# Patient Record
Sex: MALE | Race: WHITE | Employment: FULL TIME | URBAN - METROPOLITAN AREA
[De-identification: names, ages, dates, MRNs, and addresses within clinical notes are randomized per-mention and may not be internally consistent; named-entity substitution may affect disease eponyms.]

---

## 2017-03-29 ENCOUNTER — HOSPITAL ENCOUNTER (OUTPATIENT)
Age: 37
Discharge: HOME | End: 2017-03-29
Attending: FAMILY MEDICINE
Payer: MEDICAID

## 2017-03-29 VITALS
SYSTOLIC BLOOD PRESSURE: 137 MMHG | HEART RATE: 97 BPM | OXYGEN SATURATION: 96 % | RESPIRATION RATE: 16 BRPM | DIASTOLIC BLOOD PRESSURE: 89 MMHG | HEIGHT: 72 IN | BODY MASS INDEX: 20.05 KG/M2 | WEIGHT: 148 LBS | TEMPERATURE: 99 F

## 2017-03-29 DIAGNOSIS — J03.90 EXUDATIVE TONSILLITIS: Primary | ICD-10-CM

## 2017-03-29 LAB — POCT RAPID STREP: NEGATIVE

## 2017-03-29 PROCEDURE — 87430 STREP A AG IA: CPT | Performed by: FAMILY MEDICINE

## 2017-03-29 PROCEDURE — 99204 OFFICE O/P NEW MOD 45 MIN: CPT

## 2017-03-29 PROCEDURE — 87081 CULTURE SCREEN ONLY: CPT | Performed by: FAMILY MEDICINE

## 2017-03-29 PROCEDURE — 99214 OFFICE O/P EST MOD 30 MIN: CPT

## 2017-03-29 RX ORDER — AZITHROMYCIN 250 MG/1
TABLET, FILM COATED ORAL
Qty: 1 PACKAGE | Refills: 0 | Status: SHIPPED | OUTPATIENT
Start: 2017-03-29 | End: 2017-04-03

## 2017-03-29 NOTE — ED PROVIDER NOTES
Patient Seen in: [facility]    History   Patient presents with:  Sore Throat    Stated Complaint: throat pain x3 days     HPI    Patient is a 38-year-old male.   Coming in today with complaint of sore throat for the past 2 d Negative. Genitourinary: Negative. Musculoskeletal: Negative. Skin: Negative. Neurological: Negative. Positive for stated complaint: throat pain x3 days   Other systems are as noted in HPI. Constitutional and vital signs reviewed. Course     Labs Reviewed   POCT RAPID STREP - Normal   GRP A STREP CULT, THROAT       MDM           Disposition and Plan     Clinical Impression:  Exudative tonsillitis  (primary encounter diagnosis)    Disposition:  Discharge    Follow-up:  Primary MD

## 2017-04-01 ENCOUNTER — HOSPITAL ENCOUNTER (OUTPATIENT)
Age: 37
Discharge: HOME | End: 2017-04-01
Attending: FAMILY MEDICINE
Payer: MEDICAID

## 2017-04-01 VITALS
DIASTOLIC BLOOD PRESSURE: 88 MMHG | TEMPERATURE: 100 F | RESPIRATION RATE: 18 BRPM | SYSTOLIC BLOOD PRESSURE: 133 MMHG | OXYGEN SATURATION: 96 % | HEART RATE: 104 BPM

## 2017-04-01 DIAGNOSIS — B27.90 INFECTIOUS MONONUCLEOSIS WITHOUT COMPLICATION, INFECTIOUS MONONUCLEOSIS DUE TO UNSPECIFIED ORGANISM: Primary | ICD-10-CM

## 2017-04-01 PROCEDURE — 86308 HETEROPHILE ANTIBODY SCREEN: CPT | Performed by: FAMILY MEDICINE

## 2017-04-01 PROCEDURE — 99213 OFFICE O/P EST LOW 20 MIN: CPT

## 2017-04-01 PROCEDURE — 36415 COLL VENOUS BLD VENIPUNCTURE: CPT

## 2017-04-01 NOTE — ED INITIAL ASSESSMENT (HPI)
Pt here for re-evaluation, pt seen here on Wednesday, pt was dx with strep. Pt was prescribed zpak pt states he's not better. Pt continues to have a sore throat and pain with swallowing.

## 2017-04-01 NOTE — ED NOTES
Contacted the patient regarding a [facility] fax that was received stating his throat is feeling worse. The patient states the pain in his throat has gotten worse and the white spots in the throat are also worse.   He states he is currently at work but plans

## 2017-04-01 NOTE — ED PROVIDER NOTES
Patient Seen in: [facility]    History   Patient presents with: Follow - Up    Stated Complaint: follow up    Patient is a 42year old male presenting with sore throat. Sore Throat  This is a new problem.  The current epi for stated complaint: follow up  Other systems are as noted in HPI. Constitutional and vital signs reviewed. All other systems reviewed and negative except as noted above.     PSFH elements reviewed from today and agreed except as otherwise stated in diagnosis)    Disposition:  Discharge    Follow-up:  Your primary in 1 week.             Medications Prescribed:  Discharge Medication List as of 4/1/2017  3:07 PM

## 2019-05-23 ENCOUNTER — HOSPITAL ENCOUNTER (OUTPATIENT)
Age: 39
Discharge: HOME | End: 2019-05-23
Attending: EMERGENCY MEDICINE
Payer: COMMERCIAL

## 2019-05-23 VITALS
SYSTOLIC BLOOD PRESSURE: 140 MMHG | HEART RATE: 82 BPM | OXYGEN SATURATION: 96 % | RESPIRATION RATE: 18 BRPM | DIASTOLIC BLOOD PRESSURE: 98 MMHG | TEMPERATURE: 98 F

## 2019-05-23 DIAGNOSIS — B02.9 HERPES ZOSTER WITHOUT COMPLICATION: Primary | ICD-10-CM

## 2019-05-23 PROCEDURE — 99213 OFFICE O/P EST LOW 20 MIN: CPT

## 2019-05-23 PROCEDURE — 99214 OFFICE O/P EST MOD 30 MIN: CPT

## 2019-05-23 RX ORDER — ACYCLOVIR 800 MG/1
800 TABLET ORAL
Qty: 35 TABLET | Refills: 0 | Status: SHIPPED | OUTPATIENT
Start: 2019-05-23 | End: 2019-05-30

## 2019-05-23 RX ORDER — PREDNISONE 20 MG/1
40 TABLET ORAL DAILY
Qty: 10 TABLET | Refills: 0 | Status: SHIPPED | OUTPATIENT
Start: 2019-05-23 | End: 2019-05-28

## 2019-05-23 RX ORDER — PREDNISONE 1 MG/1
5 TABLET ORAL DAILY
Qty: 14 TABLET | Refills: 0 | Status: SHIPPED | OUTPATIENT
Start: 2019-05-23 | End: 2019-05-28

## 2019-05-23 NOTE — ED PROVIDER NOTES
Patient Seen in: THE [facility]    History   Patient presents with:  Rash Skin Problem (integumentary)    Stated Complaint: rash x1 day    HPI    Patient noted a rash for the last day. He noticed it last night.   He was having Posterior pharynx is normal.  Uvula midline nonswollen. Tongue is nonswollen. Oromucosa is moist.  Lips are moist.  No oral lesions.   Skin: There is an erythematous patch on the left lower thoracic back with a handful of more brightly erythematous papule

## 2019-05-23 NOTE — PROGRESS NOTES
I am listed as patient's PCP. He has never met me. Please ask him to establish care if he wants to be under our care.

## 2019-05-28 ENCOUNTER — TELEPHONE (OUTPATIENT)
Dept: INTERNAL MEDICINE CLINIC | Facility: CLINIC | Age: 39
End: 2019-05-28

## 2019-05-28 NOTE — TELEPHONE ENCOUNTER
Quinn Dalal MD (Physician)            I am listed as patient's PCP. He has never met me. Please ask him to establish care if he wants to be under our care.         [facility]

## 2019-07-24 ENCOUNTER — OFFICE VISIT (OUTPATIENT)
Dept: INTERNAL MEDICINE CLINIC | Facility: CLINIC | Age: 39
End: 2019-07-24

## 2019-07-24 VITALS
SYSTOLIC BLOOD PRESSURE: 100 MMHG | DIASTOLIC BLOOD PRESSURE: 62 MMHG | TEMPERATURE: 98 F | WEIGHT: 153 LBS | OXYGEN SATURATION: 99 % | BODY MASS INDEX: 20.28 KG/M2 | HEART RATE: 94 BPM | HEIGHT: 73 IN

## 2019-07-24 DIAGNOSIS — L72.3 SEBACEOUS CYST: Primary | ICD-10-CM

## 2019-07-24 DIAGNOSIS — Z00.00 LABORATORY EXAM ORDERED AS PART OF ROUTINE GENERAL MEDICAL EXAMINATION: ICD-10-CM

## 2019-07-24 DIAGNOSIS — K21.9 GASTROESOPHAGEAL REFLUX DISEASE, ESOPHAGITIS PRESENCE NOT SPECIFIED: ICD-10-CM

## 2019-07-24 PROCEDURE — 99204 OFFICE O/P NEW MOD 45 MIN: CPT | Performed by: INTERNAL MEDICINE

## 2019-07-24 RX ORDER — RANITIDINE 150 MG/1
150 TABLET ORAL
COMMUNITY
Start: 2016-01-01 | End: 2021-08-23

## 2019-07-24 RX ORDER — RIBOFLAVIN (VITAMIN B2) 100 MG
100 TABLET ORAL DAILY
COMMUNITY

## 2019-07-24 NOTE — PROGRESS NOTES
Darlene Sesay is a 37year old male. HPI:   Patient presents for two things: he was 6 mintues late  1. Growth on left back ricardo eof his neck: started as a pimple one year ago but could not pop it. Continues to grow. It is not painful.    2. Shingles infe Used    Alcohol use: No    Drug use: No            EXAM:   /62   Pulse 94   Temp 98 °F (36.7 °C) (Oral)   Ht 73\"   Wt 153 lb   SpO2 99%   BMI 20.19 kg/m²   GENERAL: A&O well developed, well nourished, tall and thin, in no apparent distress  SKIN: he

## 2019-08-27 ENCOUNTER — OFFICE VISIT (OUTPATIENT)
Dept: SURGERY | Facility: CLINIC | Age: 39
End: 2019-08-27

## 2019-08-27 VITALS
TEMPERATURE: 98 F | DIASTOLIC BLOOD PRESSURE: 90 MMHG | WEIGHT: 150 LBS | HEART RATE: 88 BPM | BODY MASS INDEX: 20 KG/M2 | SYSTOLIC BLOOD PRESSURE: 153 MMHG

## 2019-08-27 DIAGNOSIS — L72.3 INFLAMED SEBACEOUS CYST: Primary | ICD-10-CM

## 2019-08-27 PROCEDURE — 99243 OFF/OP CNSLTJ NEW/EST LOW 30: CPT | Performed by: SURGERY

## 2019-08-27 NOTE — H&P
New Patient Visit Note       Active Problems      1.  Inflamed sebaceous cyst        Chief Complaint   Patient presents with:  Cyst: NP cyst on lower neck       History of Present Illness     Patient presents at the request of his primary care physician for 7.00        Pack years: 7        Quit date: 2002        Years since quittin.6      Smokeless tobacco: Never Used    Substance and Sexual Activity      Alcohol use: Yes        Comment: 1 drink per month      Drug use: Yes        Types: Cannabis HENT:   Head: Normocephalic and atraumatic. Eyes: Pupils are equal, round, and reactive to light. No scleral icterus. Neck: Normal range of motion. Neck supple. No JVD present. No tracheal deviation present.    Cardiovascular: Normal rate and regular

## 2019-09-05 ENCOUNTER — OFFICE VISIT (OUTPATIENT)
Dept: SURGERY | Facility: CLINIC | Age: 39
End: 2019-09-05

## 2019-09-05 VITALS
SYSTOLIC BLOOD PRESSURE: 131 MMHG | HEART RATE: 111 BPM | BODY MASS INDEX: 19.88 KG/M2 | HEIGHT: 73 IN | WEIGHT: 150 LBS | DIASTOLIC BLOOD PRESSURE: 81 MMHG | TEMPERATURE: 98 F

## 2019-09-05 DIAGNOSIS — L72.3 INFLAMED SEBACEOUS CYST: Primary | ICD-10-CM

## 2019-09-05 PROCEDURE — 88304 TISSUE EXAM BY PATHOLOGIST: CPT | Performed by: SURGERY

## 2019-09-05 PROCEDURE — 11404 EXC TR-EXT B9+MARG 3.1-4 CM: CPT | Performed by: SURGERY

## 2019-09-05 RX ORDER — CYCLOBENZAPRINE HCL 10 MG
10 TABLET ORAL 3 TIMES DAILY PRN
COMMUNITY
End: 2019-09-19

## 2019-09-05 RX ORDER — IBUPROFEN 200 MG
200 TABLET ORAL EVERY 6 HOURS PRN
COMMUNITY
End: 2020-04-08

## 2019-09-05 NOTE — PROGRESS NOTES
Follow Up Visit Note       Active Problems      1.  Inflamed sebaceous cyst          Chief Complaint   Patient presents with:  Procedure: Neck excision         History of Present Illness    The patient presents for an office excision of a posterior neck les Exercise: No       Current Outpatient Medications:   •  Cyclobenzaprine HCl 10 MG Oral Tab, Take 10 mg by mouth 3 (three) times daily as needed for Muscle spasms. , Disp: , Rfl:   •  ibuprofen 200 MG Oral Tab, Take 200 mg by mouth every 6 (six) hours as distal pulses. Pulmonary/Chest: Effort normal and breath sounds normal.   Abdominal: Soft. Bowel sounds are normal.   Neurological: He is alert and oriented to person, place, and time. He has normal reflexes. Skin: Skin is warm and dry.         Assessme

## 2019-09-19 ENCOUNTER — OFFICE VISIT (OUTPATIENT)
Dept: SURGERY | Facility: CLINIC | Age: 39
End: 2019-09-19

## 2019-09-19 VITALS
BODY MASS INDEX: 19.88 KG/M2 | TEMPERATURE: 98 F | SYSTOLIC BLOOD PRESSURE: 120 MMHG | HEIGHT: 73 IN | WEIGHT: 150 LBS | DIASTOLIC BLOOD PRESSURE: 68 MMHG

## 2019-09-19 DIAGNOSIS — L72.3 INFLAMED SEBACEOUS CYST: Primary | ICD-10-CM

## 2019-09-19 PROCEDURE — 99024 POSTOP FOLLOW-UP VISIT: CPT | Performed by: PHYSICIAN ASSISTANT

## 2019-09-19 NOTE — PROGRESS NOTES
Post Operative Visit Note       Active Problems  1.  Inflamed sebaceous cyst         Chief Complaint   Patient presents with:  Post-Op: p/o 9/5  excision of neck cyst.        History of Present Illness   This patient presents following excision of a left po 1/2 cup of tea daily.          Exercise: No       Current Outpatient Medications:   •  ibuprofen 200 MG Oral Tab, Take 200 mg by mouth every 6 (six) hours as needed for Pain., Disp: , Rfl:   •  raNITIdine HCl (RANITIDINE 150 MAX STRENGTH) 150 MG Oral Tab, T Pulmonary/Chest: Effort normal. No respiratory distress. Abdominal: Soft. Bowel sounds are normal. He exhibits no distension and no mass. There is no tenderness. There is no rebound and no guarding. No hernia.    Neurological: He is alert and oriented t

## 2020-04-01 ENCOUNTER — TELEPHONE (OUTPATIENT)
Dept: INTERNAL MEDICINE CLINIC | Facility: CLINIC | Age: 40
End: 2020-04-01

## 2020-04-01 NOTE — TELEPHONE ENCOUNTER
Since 5 days   Has fever cough SOB @ times , increase BM and difficulty in concentration-  No other RF for covid exposure   Practice isolation and other measures for corona virus  Call if s/s worsen immediately   If tests availability increases will consid

## 2020-04-06 ENCOUNTER — TELEPHONE (OUTPATIENT)
Dept: INTERNAL MEDICINE CLINIC | Facility: CLINIC | Age: 40
End: 2020-04-06

## 2020-04-06 DIAGNOSIS — R05.9 COUGH: Primary | ICD-10-CM

## 2020-04-06 RX ORDER — ALBUTEROL SULFATE 90 UG/1
2 AEROSOL, METERED RESPIRATORY (INHALATION) EVERY 6 HOURS PRN
Qty: 2 INHALER | Refills: 0 | Status: SHIPPED | OUTPATIENT
Start: 2020-04-06 | End: 2020-05-06

## 2020-04-06 RX ORDER — LEVOFLOXACIN 500 MG/1
500 TABLET, FILM COATED ORAL DAILY
Qty: 10 TABLET | Refills: 0 | Status: SHIPPED | OUTPATIENT
Start: 2020-04-06 | End: 2020-04-16

## 2020-04-06 NOTE — TELEPHONE ENCOUNTER
Low grade  Fever cough a little short winded  No sore throat body aches  Further details see previous TE  Presumed covid + but will not qaulify for covid testing as yet  Will send empiric meds for bacterial infection  cxr order if no better by the end of t

## 2020-04-08 ENCOUNTER — VIRTUAL PHONE E/M (OUTPATIENT)
Dept: INTERNAL MEDICINE CLINIC | Facility: CLINIC | Age: 40
End: 2020-04-08

## 2020-04-08 DIAGNOSIS — R50.9 FEVER, UNSPECIFIED FEVER CAUSE: Primary | ICD-10-CM

## 2020-04-08 DIAGNOSIS — K21.9 GASTROESOPHAGEAL REFLUX DISEASE, ESOPHAGITIS PRESENCE NOT SPECIFIED: ICD-10-CM

## 2020-04-08 DIAGNOSIS — M79.10 MYALGIA: ICD-10-CM

## 2020-04-08 DIAGNOSIS — J02.9 SORE THROAT: ICD-10-CM

## 2020-04-08 DIAGNOSIS — R06.02 SHORTNESS OF BREATH: ICD-10-CM

## 2020-04-08 PROCEDURE — 99213 OFFICE O/P EST LOW 20 MIN: CPT | Performed by: INTERNAL MEDICINE

## 2020-04-08 RX ORDER — ASCORBIC ACID 500 MG
TABLET ORAL
COMMUNITY

## 2020-04-08 NOTE — PROGRESS NOTES
Virtual/Telephone Check-In    Mariela verbally consents to a Virtual/Telephone Check-In service on 04/08/20. Patient understands and accepts financial responsibility for any deductible, co-insurance and/or co-pays associated with this service.     Du kg)  03/29/17 : 148 lb (67.1 kg)  04/28/14 : 149 lb 8 oz (67.8 kg)        Dairy Products          DIARRHEA, SWELLING  Gluten Meal             FATIGUE, OTHER (SEE COMMENTS),                            RESTLESSNESS, SWELLING    Family History   Problem Relat

## 2020-04-08 NOTE — TELEPHONE ENCOUNTER
I am going to do a formal TE on patient. Please verify insurance. srini hasn't been doing formal Janice.

## 2020-04-23 ENCOUNTER — TELEPHONE (OUTPATIENT)
Dept: INTERNAL MEDICINE CLINIC | Facility: CLINIC | Age: 40
End: 2020-04-23

## 2020-04-23 DIAGNOSIS — R50.9 FEVER, UNSPECIFIED FEVER CAUSE: Primary | ICD-10-CM

## 2020-04-23 NOTE — TELEPHONE ENCOUNTER
COVID testing ordered. Pt notified process of orders and will await a call for further instructions.

## 2020-04-23 NOTE — TELEPHONE ENCOUNTER
Pt was sick earlier this month for symptoms of Covid but pt did not fit the criteria for testing per doctor, pt's job will not let him go back to work unless he is tested for covid and gets cleared

## 2020-12-06 ENCOUNTER — HOSPITAL ENCOUNTER (OUTPATIENT)
Age: 40
Discharge: HOME | End: 2020-12-06
Payer: COMMERCIAL

## 2020-12-06 VITALS
OXYGEN SATURATION: 98 % | BODY MASS INDEX: 19.64 KG/M2 | SYSTOLIC BLOOD PRESSURE: 146 MMHG | TEMPERATURE: 98 F | WEIGHT: 145 LBS | DIASTOLIC BLOOD PRESSURE: 91 MMHG | HEIGHT: 72 IN | HEART RATE: 68 BPM | RESPIRATION RATE: 18 BRPM

## 2020-12-06 DIAGNOSIS — S91.331A PUNCTURE WOUND OF RIGHT FOOT, INITIAL ENCOUNTER: Primary | ICD-10-CM

## 2020-12-06 PROCEDURE — 90471 IMMUNIZATION ADMIN: CPT | Performed by: PHYSICIAN ASSISTANT

## 2020-12-06 PROCEDURE — 99213 OFFICE O/P EST LOW 20 MIN: CPT | Performed by: PHYSICIAN ASSISTANT

## 2020-12-06 PROCEDURE — 90715 TDAP VACCINE 7 YRS/> IM: CPT | Performed by: PHYSICIAN ASSISTANT

## 2020-12-06 NOTE — ED PROVIDER NOTES
Patient Seen in: [facility] [address]      History   Patient presents with:  FB in Skin    Stated Complaint: splinter on right foot     HPI    75-year-old male presents to the immediate care for possible splinter to right foot.   Patient states normal.   Neck:      Musculoskeletal: Normal range of motion and neck supple. Musculoskeletal: Normal range of motion. Skin:     General: Skin is warm and dry. Capillary Refill: Capillary refill takes less than 2 seconds.       Comments: Puncture w

## 2020-12-06 NOTE — ED INITIAL ASSESSMENT (HPI)
Pt thinks he may have a pine needle in the bottom of his foot. Noted some redness to the right foot. Pt states that he tried getting it out but was unable.

## 2021-08-23 ENCOUNTER — OFFICE VISIT (OUTPATIENT)
Dept: INTERNAL MEDICINE CLINIC | Facility: CLINIC | Age: 41
End: 2021-08-23
Payer: COMMERCIAL

## 2021-08-23 VITALS
HEART RATE: 71 BPM | RESPIRATION RATE: 16 BRPM | WEIGHT: 151 LBS | HEIGHT: 72.84 IN | TEMPERATURE: 98 F | SYSTOLIC BLOOD PRESSURE: 140 MMHG | OXYGEN SATURATION: 98 % | DIASTOLIC BLOOD PRESSURE: 78 MMHG | BODY MASS INDEX: 20.01 KG/M2

## 2021-08-23 DIAGNOSIS — Z00.00 ROUTINE GENERAL MEDICAL EXAMINATION AT A HEALTH CARE FACILITY: Primary | ICD-10-CM

## 2021-08-23 DIAGNOSIS — F12.90 MARIJUANA USE, CONTINUOUS: ICD-10-CM

## 2021-08-23 DIAGNOSIS — I10 PRIMARY HYPERTENSION: ICD-10-CM

## 2021-08-23 DIAGNOSIS — F41.9 ANXIETY: ICD-10-CM

## 2021-08-23 PROCEDURE — 3008F BODY MASS INDEX DOCD: CPT | Performed by: INTERNAL MEDICINE

## 2021-08-23 PROCEDURE — 3077F SYST BP >= 140 MM HG: CPT | Performed by: INTERNAL MEDICINE

## 2021-08-23 PROCEDURE — 99396 PREV VISIT EST AGE 40-64: CPT | Performed by: INTERNAL MEDICINE

## 2021-08-23 PROCEDURE — 3078F DIAST BP <80 MM HG: CPT | Performed by: INTERNAL MEDICINE

## 2021-08-23 RX ORDER — OMEPRAZOLE 20 MG/1
20 CAPSULE, DELAYED RELEASE ORAL
COMMUNITY

## 2021-08-23 RX ORDER — CHLORAL HYDRATE 500 MG
1000 CAPSULE ORAL DAILY
COMMUNITY

## 2021-08-23 RX ORDER — LOSARTAN POTASSIUM 25 MG/1
25 TABLET ORAL EVERY EVENING
Qty: 90 TABLET | Refills: 0 | Status: SHIPPED | OUTPATIENT
Start: 2021-08-23

## 2021-08-23 NOTE — PATIENT INSTRUCTIONS
Please start losartan 25 mg every evening for your high blood pressure. Do not start this medication until I tell you your labs are normal.     Please bring your machine into your next office visit to ensure it is accurate. I like the OMRON brand.  Please k

## 2021-08-23 NOTE — PROGRESS NOTES
Willis Grande is a 35year old male. HPI:   Patient presents for physical exam.  1. GERD: needs omeprazole every day. 2. Daily Marijuana Use: using it as coping mechanism for stress and anxiety. Used to only use as recreation.    3. Suspicious skin sp Diabetes Maternal Grandmother         Type 2, starting age 46s?    • Obesity Maternal Grandmother         Starting age 35s   • Heart Disorder Maternal Grandfather         Heart attack age 62s   • Other (Other) Maternal Grandfather         ra   • Hypertensio pleasant  EXTREMITIES: no cyanosis, clubbing or jorge luis    ASSESSMENT AND PLAN:   # HTN and Asymmetric BP: no symptoms of subclavian steal. Start losartan every night and will start checking home pressures.    - Counseled on healthy plant based nutrition, regu

## 2021-08-31 ENCOUNTER — LAB ENCOUNTER (OUTPATIENT)
Dept: LAB | Age: 41
End: 2021-08-31
Attending: INTERNAL MEDICINE
Payer: COMMERCIAL

## 2021-08-31 DIAGNOSIS — Z00.00 ROUTINE GENERAL MEDICAL EXAMINATION AT A HEALTH CARE FACILITY: ICD-10-CM

## 2021-08-31 LAB
ALT SERPL-CCNC: 24 U/L
ANION GAP SERPL CALC-SCNC: 3 MMOL/L (ref 0–18)
AST SERPL-CCNC: 18 U/L (ref 15–37)
BUN BLD-MCNC: 18 MG/DL (ref 7–18)
CALCIUM BLD-MCNC: 9.1 MG/DL (ref 8.5–10.1)
CHLORIDE SERPL-SCNC: 104 MMOL/L (ref 98–112)
CHOLEST SMN-MCNC: 149 MG/DL (ref ?–200)
CO2 SERPL-SCNC: 31 MMOL/L (ref 21–32)
CREAT BLD-MCNC: 0.94 MG/DL
GLUCOSE BLD-MCNC: 95 MG/DL (ref 70–99)
HCV AB SERPL QL IA: NONREACTIVE
HDLC SERPL-MCNC: 56 MG/DL (ref 40–59)
LDLC SERPL CALC-MCNC: 84 MG/DL (ref ?–100)
NONHDLC SERPL-MCNC: 93 MG/DL (ref ?–130)
OSMOLALITY SERPL CALC.SUM OF ELEC: 288 MOSM/KG (ref 275–295)
PATIENT FASTING Y/N/NP: YES
PATIENT FASTING Y/N/NP: YES
POTASSIUM SERPL-SCNC: 4.2 MMOL/L (ref 3.5–5.1)
SODIUM SERPL-SCNC: 138 MMOL/L (ref 136–145)
T PALLIDUM AB SER QL IA: NONREACTIVE
TRIGL SERPL-MCNC: 39 MG/DL (ref 30–149)
VLDLC SERPL CALC-MCNC: 6 MG/DL (ref 0–30)

## 2021-08-31 PROCEDURE — 80061 LIPID PANEL: CPT | Performed by: INTERNAL MEDICINE

## 2021-08-31 PROCEDURE — 87591 N.GONORRHOEAE DNA AMP PROB: CPT | Performed by: INTERNAL MEDICINE

## 2021-08-31 PROCEDURE — 80048 BASIC METABOLIC PNL TOTAL CA: CPT | Performed by: INTERNAL MEDICINE

## 2021-08-31 PROCEDURE — 84450 TRANSFERASE (AST) (SGOT): CPT | Performed by: INTERNAL MEDICINE

## 2021-08-31 PROCEDURE — 86803 HEPATITIS C AB TEST: CPT | Performed by: INTERNAL MEDICINE

## 2021-08-31 PROCEDURE — 87389 HIV-1 AG W/HIV-1&-2 AB AG IA: CPT | Performed by: INTERNAL MEDICINE

## 2021-08-31 PROCEDURE — 84460 ALANINE AMINO (ALT) (SGPT): CPT | Performed by: INTERNAL MEDICINE

## 2021-08-31 PROCEDURE — 87491 CHLMYD TRACH DNA AMP PROBE: CPT | Performed by: INTERNAL MEDICINE

## 2021-08-31 PROCEDURE — 86780 TREPONEMA PALLIDUM: CPT | Performed by: INTERNAL MEDICINE

## 2021-09-01 LAB
C TRACH DNA SPEC QL NAA+PROBE: NEGATIVE
N GONORRHOEA DNA SPEC QL NAA+PROBE: NEGATIVE

## 2023-01-23 RX ORDER — IBUPROFEN 800 MG
TABLET ORAL
COMMUNITY
Start: 2020-03-01

## 2024-08-26 NOTE — DISCHARGE INSTRUCTIONS
Wash area with warm soapy water.  May apply hydrocortisone 1% cream to the area twice daily.  May also apply a triple antibiotic or Neosporin to the area to prevent infection.  To watch for any worsening of redness swelling or breaking in the skin if this happens follow-up.  If symptoms just do not improve after 1 week follow-up with your primary care physician.

## 2024-08-26 NOTE — ED PROVIDER NOTES
Patient Seen in: [facility]      History     Chief Complaint   Patient presents with    Bite     Stated Complaint: Bug Bite    Subjective:   44-year-old male presents today with history of what he believes is a bug bite to the left lower leg.  Started off as a small bump and now has redness around the area.  No drainage from the wound.  Denies any fever chills.  No body aches joint pain.  Pain to the area but no pain.  No other symptoms or concerns.  States initial bite was 2 days prior to arrival.  The patient's medication list, past medical history and social history elements as listed in today's nurse's notes were reviewed and agreed (except as otherwise stated in the HPI).  The patient's family history reviewed and determined to be noncontributory to the presenting problem            Objective:   Past Medical History:    Anxiety    never diagnosed    COVID-19    Depression    Never diagnosed    Esophageal reflux              Past Surgical History:   Procedure Laterality Date    Other surgical history      pectus excavatum repair    Skin surgery  ?    Growth removed on my back                Social History     Socioeconomic History    Marital status: Single   Tobacco Use    Smoking status: Former     Current packs/day: 0.00     Average packs/day: 1 pack/day for 7.0 years (7.0 ttl pk-yrs)     Types: Cigarettes     Start date: 1995     Quit date: 2002     Years since quittin.6    Smokeless tobacco: Never   Vaping Use    Vaping status: Former    Start date: 2021    Substances: THC    Devices: Pre-filled or refillable cartridge   Substance and Sexual Activity    Alcohol use: Yes     Comment: once per month    Drug use: Yes     Types: Cannabis     Comment: couple days per week   Other Topics Concern    Caffeine Concern Yes    Stress Concern Yes     Comment: Never diagnosed    Weight Concern No    Special Diet Yes     Comment: Gluten intolerance and mild lsctose intolerance     Exercise No    Seat Belt No              Review of Systems    Positive for stated Chief Complaint: Bite    Other systems are as noted in HPI.  Constitutional and vital signs reviewed.      All other systems reviewed and negative except as noted above.    Physical Exam     ED Triage Vitals [08/26/24 1543]   /89   Pulse 80   Resp 18   Temp 97.9 °F (36.6 °C)   Temp src Temporal   SpO2 98 %   O2 Device None (Room air)       Current Vitals:   Vital Signs  BP: 138/89  Pulse: 80  Resp: 18  Temp: 97.9 °F (36.6 °C)  Temp src: Temporal    Oxygen Therapy  SpO2: 98 %  O2 Device: None (Room air)            Physical Exam  Vitals and nursing note reviewed.   Constitutional:       Appearance: Normal appearance.   HENT:      Head: Normocephalic.      Mouth/Throat:      Mouth: Mucous membranes are moist.   Cardiovascular:      Rate and Rhythm: Normal rate.   Pulmonary:      Effort: Pulmonary effort is normal.   Skin:     General: Skin is warm and dry.      Comments: Raised area of redness noted to the left anterior lower leg.  No gross swelling no pus drainage.  No puncture wound or ulcer.   Neurological:      Mental Status: He is alert and oriented to person, place, and time.               ED Course   Labs Reviewed - No data to display                   MDM     Please note that this report has been produced using speech recognition software and may contain errors related to that system including, but not limited to, errors in grammar, punctuation, and spelling, as well as words and phrases that possibly may have been recognized inappropriately.  If there are any questions or concerns, contact the dictating provider for clarification.                                         Medical Decision Making  Differential diagnosis includes but is not limited to: Insect bites with local reaction, cellulitis, necrotizing fasciitis      Presented today with possible bite from insect to the left lower leg.  Now has redness with mild itching  to the area.  Denies any pain.  No joint pain body aches.  No warmth to the area.  Suspect local reaction to possible bug bites.  Encouraged to apply hydrocortisone cream to the area.  Skin care instructions given.  To follow with primary care physician 1 week if symptoms do not improve.  Follow-up sooner with any worsening of redness, swelling, pus drainage or opening wound.  Patient verbalized understanding and agreed plan of care.    Risk  OTC drugs.  Prescription drug management.        Disposition and Plan     Clinical Impression:  1. Bug bite, initial encounter         Disposition:  Discharge  8/26/2024  4:01 pm    Follow-up:  Alice Sutton MD  [address] [address] [phone]    In 1 week  As needed          Medications Prescribed:  Discharge Medication List as of 8/26/2024  4:02 PM

## 2024-08-26 NOTE — ED INITIAL ASSESSMENT (HPI)
Pt. Got an insect bite a couple days ago on the left lower leg and states the area has become worse.

## 2025-04-14 NOTE — PROGRESS NOTES
Clay López is a 45 year old male.     HPI:   Patient presents for physical exam. I have not seen him since 2021. He was 7 minutes late to appt.   Screen for colon cancer - he is open to getting colonosocpy.   History of HTN -home pressures are less than 130/80. He has a arm cuff.  he states he has white coat syndrome and has made significant lifestyle changes so stopped losartan years ago. He eats home-cooked meals, a lot of veggies. He walks a lot at work, also walks his mother's dog every day, walking 2 miles per day.   Sinus issues - has had issues since January, 2025 but has not seen a physician for it. He thinks he had influenzia in mid-January and then developed sinus pain and thena foul odor from left nare. It was intermittent and would occur once every 7-10 days. Last episode was 2 weeks ago. Lasts for 10 minutes and usually when he falls asleep and is changing position of his head. Feels it is harder for him to release mucous from left nare. No ear pain. No sinus pressure/pain. Occ has rinorrea and post nasal drainage. No sore throat. Denies headaches. He has not tried anything for these symptoms. Symptom severity is improving slowly.   He is requesting STI screening. Has not been sexually active in one year.   Marijuana use - he mainly uses it for stress relief and also a bit recreational use.     REVIEW OF SYSTEMS:   GENERAL HEALTH: feels well otherwise. No f/c  NEURO: denies any headaches, LH, dizzyness, LOC, falls  VISION: denies any blurred or double vision  RESPIRATORY: denies shortness of breath, cough, or congestion  CARDIOVASCULAR: denies chest pain, pressure or palpitations  GI: denies abdominal pain, constipation, diarrhea, n/v, BRBPR, melena  : no dysuria or hematuria. Denies difficulty initiating urinary stream and no nocturia.   SKIN: notices more skin flakiness of forehead and near hairline and beard. Feels like dandruff.   PSYCH: mood is stable. Denies depression or anxiety.    EXT: denies edema     Wt Readings from Last 6 Encounters:   24 150 lb (68 kg)   21 151 lb (68.5 kg)   20 145 lb (65.8 kg)   19 150 lb (68 kg)   19 150 lb (68 kg)   19 150 lb (68 kg)       Allergies   Allergen Reactions    Dairy Products DIARRHEA and SWELLING    Gluten Meal FATIGUE, OTHER (SEE COMMENTS), RESTLESSNESS and SWELLING       Family History   Problem Relation Age of Onset    Heart Disorder Father         Heart attack age 50s, cardiac arrest age 59    Hypertension Father         Starting age 40s    Obesity Father         Starting age 40s    Heart Disorder Maternal Grandmother         Heart failure, age 70s    Other (Other) Maternal Grandmother         ra    Diabetes Maternal Grandmother         Type 2, starting age 50s?    Obesity Maternal Grandmother         Starting age 30s    Heart Disorder Maternal Grandfather         Heart attack age 60s    Other (Other) Maternal Grandfather         ra    Hypertension Mother         Starting age 60s    Obesity Mother         Starting age 30s    Dementia Paternal Grandmother         Alzheimers starting age 80s    Musculo-skelatal Disorder Paternal Grandmother         Arthritis (disfigured hands)      Past Medical History:    Anxiety    never diagnosed    COVID-19    Depression    Never diagnosed    Esophageal reflux      Past Surgical History:   Procedure Laterality Date    Other surgical history      pectus excavatum repair    Skin surgery  2018?    Growth removed on my back      Social History:    Social History     Socioeconomic History    Marital status: Single   Tobacco Use    Smoking status: Former     Current packs/day: 0.00     Average packs/day: 1 pack/day for 7.0 years (7.0 ttl pk-yrs)     Types: Cigarettes     Start date: 1995     Quit date: 2002     Years since quittin.2    Smokeless tobacco: Never   Vaping Use    Vaping status: Former    Start date: 2021    Substances: THC    Devices: Pre-filled or  refillable cartridge   Substance and Sexual Activity    Alcohol use: Yes     Comment: once per month    Drug use: Yes     Types: Cannabis     Comment: couple days per week   Other Topics Concern    Caffeine Concern Yes    Stress Concern Yes     Comment: Never diagnosed    Weight Concern No    Special Diet Yes     Comment: Gluten intolerance and mild lsctose intolerance    Exercise No    Seat Belt No             EXAM:   /80 (BP Location: Left arm)   Pulse 81   Temp 98.1 °F (36.7 °C) (Temporal)   Ht 6' 1\" (1.854 m)   Wt 161 lb (73 kg)   SpO2 97%   BMI 21.24 kg/m²   GENERAL: A&O well developed, well nourished,in no apparent distress  SKIN: flaky, dry, erythematous patches of beard and scalp and behind ears and over face  HEENT: atraumatic, MMM, throat is clear, bilateral TM with normal light reflex, bilateral nares are not boggy, I do not appreciate any nasal polyps  NECK: supple, no jvd, no thyromegaly  LUNGS: clear to auscultation bilateraly, no c/w/r  CARDIO: RRR without g/m/r  GI: soft non tender nondistended no hsm bs throughout  NEURO: CN 2-12 grossly intact  PSYCH: pleasant  EXTREMITIES: no cyanosis, clubbing or edema      ASSESSMENT AND PLAN:   # sinus issue - seems to be self resolved but we did discuss saline rinses, nasal sprays, oral anti-histamines, and ENT referral if symptoms return.   # White coat syndrome without HTN - home pressures are at goal.   # Seborrheic Dermatitis of scalp and face - ketconoazole gel and shampoo sent.   # Marijuana Use, Daily: mainly for stress reduction. He does not want to stop. Declines any other stress management options.   # Health Maintenance: CPX on April, 2025  Colon Cancer Screening:no FHx. Referral entered.   Bone Health/Fall Prevention ([employer]): educated on dietary calcium/vit D3, weight bearing exercises and fall prevention  Abdominal u/s: (any M >65 smoker/smoking): screen at age 65.   Prostate Cancer Screening: discuss screen at age 50  Vaccines: TDAP  2020. Recommended on covid and flu.   Hep C screening: Ab neg in 2021  Medical POA: mother is primary and next is friend Kevin Nye       The patient indicates understanding of these issues and agrees to the plan.  The patient is asked to return in 1 year for wellness exam  Alice Sutton MD

## 2025-04-14 NOTE — PATIENT INSTRUCTIONS
I recommend the following vaccines -  Annual FLU every September, October.    Stay up to date with COVID vaccines.     Treatment for sinus issues -  Nasal saline rinses  Azelastine nasal spray  Fluticasone nasal spray  Oral - allegra, claritin, zyrtec (generic is fine)

## (undated) NOTE — LETTER
[facility] [address], [place] [address] [facility]  [phone]            June 13, 2019      Feliz Amado  [address] [facility] [address]          Dear Svetlana Huerta:     Our office has been trying

## (undated) NOTE — LETTER
19    Patient: Moises Ulloa  : 1980 Visit date: 2019    Dear  Dr. Heather Huff MD,    Thank you for referring Moises Ulloa to my practice. Please find my assessment and plan below.         Assessment   Inflamed sebaceous cyst  (primary encounte

## (undated) NOTE — ED AVS SNAPSHOT
[facility]  Shari Moones    [address] [place] [address]    Phone:  [phone]    Fax:  [phone]           Ree Parker   MRN: [id number]    Department:  THE [facility]   Date of Visit:  4/ Expect to receive an electronic request (by e-mail or text) to complete a self-assessment the day after your visit. You may also receive a call from our patient liason soon after your visit.  Also, some patients receive a detailed feedback survey mailed to [place] [address] [address]  ([address]) [address] [place] [facility] [phone] [address] [facility] [address]. ([address] [place] [address]) [phone] [address] [place] [address] [address] ( Call [phone] for help. [employer] is NOT to be used for urgent needs. For medical emergencies, dial 911.

## (undated) NOTE — LETTER
08/26/19        [address] [phone]      Dear Lesvia Boss,    [facility] records indicate that you have outstanding lab work and or testing that was ordered for you and has not yet been completed: Fasting lab work   *fast for at [employer]

## (undated) NOTE — LETTER
19    Patient: Lani Andrade  : 1980 Visit date: 2019    Dear  Dr. Fernanda Marlow MD,    Thank you for referring Lani Andrade to my practice. Please find my assessment and plan below.              Assessment   Inflamed sebaceous cyst  (primary en

## (undated) NOTE — LETTER
20  RE: Yonisrogerio Juarez  : 1980    To Whom It May Concern,    Patient is under my medical care. Based on patient's symptoms we are concerned for COVID-19.  Thus, it is best for him and the public to stay quarantined for 14 days from onset of sym

## (undated) NOTE — LETTER
July 12, 2019    [address] [facility] [address] [facility]        Dear Irena Ramirez ,     Being proactive in the management of your health will help you to achieve and maintain a positive quality of life and help to minimize complications from chroni

## (undated) NOTE — ED AVS SNAPSHOT
[facility]  Janneth Correiad    [address] [place] [address]    Phone:  [phone]    Fax:  [phone]           Liu Gunnal   MRN: [id number]    Department:  THE [facility]   Date of Visit:  3/ To Check ER Wait Times:  TEXT 'ERwait' to [address]      Click [email or web address]      Or call [phone]    If you have any problems with your follow-up, please call our  at [phone].     Si usted tiene algun problema con hernandez sequimiento, por I have read and understand the instructions given to me by my caregivers. 24[facility]Hour Pharmacies        Pharmacy Address Phone Number   [phone] ([address]) [place] If you've recently had a stay at the Hospital you can access your discharge instructions in [employer] by going to Visits < Admission Summaries.  If you've been to the Emergency Department or your doctor's office, you can view your past visit information in My